# Patient Record
Sex: FEMALE | Race: WHITE | Employment: PART TIME | URBAN - METROPOLITAN AREA
[De-identification: names, ages, dates, MRNs, and addresses within clinical notes are randomized per-mention and may not be internally consistent; named-entity substitution may affect disease eponyms.]

---

## 2024-08-13 ENCOUNTER — OFFICE VISIT (OUTPATIENT)
Dept: FAMILY MEDICINE CLINIC | Age: 24
End: 2024-08-13
Payer: COMMERCIAL

## 2024-08-13 VITALS
HEART RATE: 101 BPM | SYSTOLIC BLOOD PRESSURE: 110 MMHG | DIASTOLIC BLOOD PRESSURE: 68 MMHG | HEIGHT: 63 IN | WEIGHT: 117.8 LBS | OXYGEN SATURATION: 98 % | BODY MASS INDEX: 20.87 KG/M2

## 2024-08-13 DIAGNOSIS — M94.0 COSTOCHONDRITIS: Primary | ICD-10-CM

## 2024-08-13 PROCEDURE — 99213 OFFICE O/P EST LOW 20 MIN: CPT

## 2024-08-13 SDOH — ECONOMIC STABILITY: FOOD INSECURITY: WITHIN THE PAST 12 MONTHS, THE FOOD YOU BOUGHT JUST DIDN'T LAST AND YOU DIDN'T HAVE MONEY TO GET MORE.: NEVER TRUE

## 2024-08-13 SDOH — ECONOMIC STABILITY: FOOD INSECURITY: WITHIN THE PAST 12 MONTHS, YOU WORRIED THAT YOUR FOOD WOULD RUN OUT BEFORE YOU GOT MONEY TO BUY MORE.: NEVER TRUE

## 2024-08-13 SDOH — ECONOMIC STABILITY: INCOME INSECURITY: HOW HARD IS IT FOR YOU TO PAY FOR THE VERY BASICS LIKE FOOD, HOUSING, MEDICAL CARE, AND HEATING?: NOT HARD AT ALL

## 2024-08-13 ASSESSMENT — ENCOUNTER SYMPTOMS
WHEEZING: 0
COUGH: 0
BLOOD IN STOOL: 0
CONSTIPATION: 0
COLOR CHANGE: 0
DIARRHEA: 0
SORE THROAT: 0
SHORTNESS OF BREATH: 0
ABDOMINAL PAIN: 0

## 2024-08-13 ASSESSMENT — PATIENT HEALTH QUESTIONNAIRE - PHQ9
SUM OF ALL RESPONSES TO PHQ QUESTIONS 1-9: 0
SUM OF ALL RESPONSES TO PHQ QUESTIONS 1-9: 0
SUM OF ALL RESPONSES TO PHQ9 QUESTIONS 1 & 2: 0
2. FEELING DOWN, DEPRESSED OR HOPELESS: NOT AT ALL
SUM OF ALL RESPONSES TO PHQ QUESTIONS 1-9: 0
1. LITTLE INTEREST OR PLEASURE IN DOING THINGS: NOT AT ALL
SUM OF ALL RESPONSES TO PHQ QUESTIONS 1-9: 0

## 2024-08-13 NOTE — PROGRESS NOTES
Venus Spain (:  2000) is a 24 y.o. female,Established patient, here for evaluation of the following chief complaint(s):  Abdominal Pain (Right side while pregnant sharp pain comes and goes)      Assessment & Plan   ASSESSMENT/PLAN:  1. Costochondritis  -     diclofenac sodium (VOLTAREN) 1 % GEL; Apply 4 g topically 4 times daily, Topical, 4 TIMES DAILY Starting e 2024, Until Thu 2024, For 30 days, Disp-480 g, R-0, Normal  -Physical exam and presentation today is consistent with a costochondritis.  Spent a lengthy amount of time educating patient on stretches to do.  Patient is agreeable to stretching and topical NSAID given patient is breast-feeding.  Patient did have an ultrasound of the right upper quadrant ordered by OB/GYN that was unremarkable.  Gallbladder was unremarkable, liver was unremarkable.  Patient had no tenderness epigastric or on the right upper quadrant.  Was contained to reapplying.  Patient did have relief of pain with manipulation of rib cage.  Did experience more pain with outward flexion of rib cage.  Consistent with costochondritis.  Patient was educated on worsening symptoms and when to seek care.  Overall has gotten slightly better since delivery of her 3-month-old son.  Will continue to have patient stretch could consider physical therapy if needed.      No follow-ups on file.         Subjective   SUBJECTIVE/OBJECTIVE:  HPI: Patient presents to the office today with concerns of right upper quadrant pain.  He is very tender on the rib line.  Patient endorses that this started approximately 3 to 4 months into her pregnancy.  Has followed up with OB/GYN endorses dull nagging pain.  Since birth 3 weeks ago slightly gotten better however continues to persist.    Review of Systems   HENT:  Negative for congestion and sore throat.    Respiratory:  Negative for cough, shortness of breath and wheezing.    Cardiovascular:  Negative for chest pain and leg swelling.

## 2024-11-19 SDOH — HEALTH STABILITY: PHYSICAL HEALTH: ON AVERAGE, HOW MANY DAYS PER WEEK DO YOU ENGAGE IN MODERATE TO STRENUOUS EXERCISE (LIKE A BRISK WALK)?: 5 DAYS

## 2024-11-19 SDOH — HEALTH STABILITY: PHYSICAL HEALTH: ON AVERAGE, HOW MANY MINUTES DO YOU ENGAGE IN EXERCISE AT THIS LEVEL?: 30 MIN

## 2024-11-20 ENCOUNTER — OFFICE VISIT (OUTPATIENT)
Dept: FAMILY MEDICINE CLINIC | Age: 24
End: 2024-11-20
Payer: COMMERCIAL

## 2024-11-20 VITALS
WEIGHT: 113.6 LBS | HEIGHT: 63 IN | DIASTOLIC BLOOD PRESSURE: 68 MMHG | BODY MASS INDEX: 20.13 KG/M2 | HEART RATE: 60 BPM | OXYGEN SATURATION: 98 % | SYSTOLIC BLOOD PRESSURE: 112 MMHG

## 2024-11-20 DIAGNOSIS — Z76.89 ENCOUNTER TO ESTABLISH CARE: Primary | ICD-10-CM

## 2024-11-20 DIAGNOSIS — G90.A POTS (POSTURAL ORTHOSTATIC TACHYCARDIA SYNDROME): ICD-10-CM

## 2024-11-20 DIAGNOSIS — E83.110 HEREDITARY HEMOCHROMATOSIS (HCC): ICD-10-CM

## 2024-11-20 PROCEDURE — 99213 OFFICE O/P EST LOW 20 MIN: CPT | Performed by: NURSE PRACTITIONER

## 2024-11-20 RX ORDER — MAGNESIUM OXIDE 400 MG/1
400 TABLET ORAL DAILY
COMMUNITY

## 2024-11-20 RX ORDER — PNV NO.95/FERROUS FUM/FOLIC AC 28MG-0.8MG
1 TABLET ORAL DAILY
COMMUNITY
Start: 2024-07-30

## 2024-11-20 ASSESSMENT — ENCOUNTER SYMPTOMS
CHOKING: 0
COUGH: 0
NAUSEA: 0
VOMITING: 0
SHORTNESS OF BREATH: 0
DIARRHEA: 0
CONSTIPATION: 0

## 2024-11-20 NOTE — PROGRESS NOTES
General: Normal range of motion.      Cervical back: Normal range of motion.   Skin:     General: Skin is warm and dry.      Capillary Refill: Capillary refill takes less than 2 seconds.   Neurological:      General: No focal deficit present.      Mental Status: She is alert and oriented to person, place, and time.         ASSESSMENT:  1. Encounter to establish care    2. Hereditary hemochromatosis (HCC)    3. POTS (postural orthostatic tachycardia syndrome)         PLAN:   1. Encounter to establish care  Assessment & Plan:  Recent potassium at cardiology office showed potassium of 3.3 was started on daily dose with request for repeat potassium at primary's.  This was drawn today results will be available and discussed within the next 24 to 48 hours   Orders:  -     Basic Metabolic Panel  2. Hereditary hemochromatosis (HCC)  -     Non [facility] - External Referral To Cardiology  3. POTS (postural orthostatic tachycardia syndrome)  -     Non [facility] - External Referral To Cardiology  -     Basic Metabolic Panel       Follow up: Return in about 6 months (around 5/20/2025).     Electronically signed by MIESHA Garcia CNP on 11/20/2024 at 12:55 PM.

## 2024-11-21 LAB
ANION GAP SERPL CALCULATED.3IONS-SCNC: 13 MMOL/L (ref 3–16)
BUN SERPL-MCNC: 13 MG/DL (ref 7–20)
CALCIUM SERPL-MCNC: 9.6 MG/DL (ref 8.3–10.6)
CHLORIDE SERPL-SCNC: 103 MMOL/L (ref 99–110)
CO2 SERPL-SCNC: 25 MMOL/L (ref 21–32)
CREAT SERPL-MCNC: 0.6 MG/DL (ref 0.6–1.1)
GFR SERPLBLD CREATININE-BSD FMLA CKD-EPI: >90 ML/MIN/{1.73_M2}
GLUCOSE SERPL-MCNC: 72 MG/DL (ref 70–99)
POTASSIUM SERPL-SCNC: 4.2 MMOL/L (ref 3.5–5.1)
SODIUM SERPL-SCNC: 141 MMOL/L (ref 136–145)

## 2025-06-03 ENCOUNTER — OFFICE VISIT (OUTPATIENT)
Dept: FAMILY MEDICINE CLINIC | Age: 25
End: 2025-06-03
Payer: COMMERCIAL

## 2025-06-03 VITALS
BODY MASS INDEX: 20.2 KG/M2 | OXYGEN SATURATION: 99 % | HEART RATE: 90 BPM | SYSTOLIC BLOOD PRESSURE: 110 MMHG | DIASTOLIC BLOOD PRESSURE: 68 MMHG | WEIGHT: 114 LBS | HEIGHT: 63 IN

## 2025-06-03 DIAGNOSIS — B07.0 PLANTAR WART OF RIGHT FOOT: Primary | ICD-10-CM

## 2025-06-03 PROCEDURE — 99213 OFFICE O/P EST LOW 20 MIN: CPT | Performed by: NURSE PRACTITIONER

## 2025-06-03 SDOH — ECONOMIC STABILITY: FOOD INSECURITY: WITHIN THE PAST 12 MONTHS, THE FOOD YOU BOUGHT JUST DIDN'T LAST AND YOU DIDN'T HAVE MONEY TO GET MORE.: NEVER TRUE

## 2025-06-03 SDOH — ECONOMIC STABILITY: FOOD INSECURITY: WITHIN THE PAST 12 MONTHS, YOU WORRIED THAT YOUR FOOD WOULD RUN OUT BEFORE YOU GOT MONEY TO BUY MORE.: NEVER TRUE

## 2025-06-03 ASSESSMENT — PATIENT HEALTH QUESTIONNAIRE - PHQ9
1. LITTLE INTEREST OR PLEASURE IN DOING THINGS: NOT AT ALL
SUM OF ALL RESPONSES TO PHQ QUESTIONS 1-9: 0
2. FEELING DOWN, DEPRESSED OR HOPELESS: NOT AT ALL

## 2025-06-03 NOTE — PROGRESS NOTES
PROGRESS NOTE  Date of Service:  6/3/2025    SUBJECTIVE:  Patient ID: Venus Spain is a 25 y.o. female    HPI: pt is breastfeeding and 9 weeks pregnant  Pt presents with sensation of numbness on the outer aspect of her right great toe  No known injury   Also has one small wart on the outer aspect of her right outer aspect of foot    Past Medical History:   Diagnosis Date    Anxiety     Disease of blood and blood forming organ 2020    hemochromatosis    Hemochromatosis     Migraine 2015    POTS (postural orthostatic tachycardia syndrome)     POTS (postural orthostatic tachycardia syndrome) 11/20/2024      History reviewed. No pertinent surgical history.   Social History     Tobacco Use    Smoking status: Never    Smokeless tobacco: Never   Substance Use Topics    Alcohol use: Not Currently     Comment: socially      Family History   Problem Relation Age of Onset    Heart Disease Mother         V-tach    Diabetes Mother     Hypertension Mother     Migraines Mother     Arrhythmia Mother         V-tach    High Blood Pressure Mother     No Known Problems Father     Heart Surgery Maternal Grandmother     Atrial Fibrillation Maternal Grandmother     Heart Disease Maternal Grandmother         Multiple blockages    High Blood Pressure Maternal Grandmother     Colon Cancer Paternal Grandfather     Prostate Cancer Paternal Grandfather      Current Outpatient Medications on File Prior to Visit   Medication Sig Dispense Refill    magnesium oxide (MAG-OX) 400 MG tablet Take 1 tablet by mouth daily      Prenatal Vit-Fe Fumarate-FA (PRENATAL VITAMINS) 28-0.8 MG TABS Take 1 tablet by mouth daily       No current facility-administered medications on file prior to visit.       Allergies   Allergen Reactions    Lanolin Hives, Itching, Rash and Swelling    Linalool Hives, Itching, Rash and Swelling    Shellfish Allergy Itching, Rash and Swelling        Review of Systems    OBJECTIVE:  Vitals:    06/03/25 1450   BP: 110/68   BP

## 2025-07-15 LAB
FERRITIN: 10 NG/ML (ref 30–306.8)
HCT VFR BLD CALC: 33 % (ref 36–46)
HEMOGLOBIN: 11.8 G/DL (ref 12–15.2)
IRON % SATURATION: 61 % (ref 15–55)
IRON: 210 MCG/DL (ref 50–212)
MCH RBC QN AUTO: 35 PG (ref 27–33)
MCHC RBC AUTO-ENTMCNC: 35.9 G/DL (ref 32–36)
MCV RBC AUTO: 97.5 FL (ref 82–97)
PDW BLD-RTO: 15.1 % (ref 12.3–17)
PLATELET # BLD: 216 THOU/MCL (ref 140–375)
PMV BLD AUTO: 7.6 FL (ref 7–11.5)
RBC # BLD: 3.38 MIL/MCL (ref 3.8–5.2)
TOTAL IRON BINDING CAPACITY: 344 MCG/DL (ref 260–450)
TRANSFERRIN: 246 MG/DL (ref 203–362)
WBC # BLD: 5 THOU/MCL (ref 3.6–10.5)